# Patient Record
Sex: FEMALE | URBAN - METROPOLITAN AREA
[De-identification: names, ages, dates, MRNs, and addresses within clinical notes are randomized per-mention and may not be internally consistent; named-entity substitution may affect disease eponyms.]

---

## 2018-12-11 ENCOUNTER — VIRTUAL VISIT (OUTPATIENT)
Dept: FAMILY MEDICINE | Facility: OTHER | Age: 43
End: 2018-12-11

## 2018-12-11 NOTE — PROGRESS NOTES
"Date:   Clinician: Juan Jose Mcintosh  Clinician NPI: [id number]  Patient: Ana Solano  Patient : 1975  Patient Address: [facility] [place] [address]  Patient Phone: [phone]  Visit Protocol: Yeast infection  Patient Summary:  Ana is a 43 year old ( : 1975 ) female who initiated a Visit for a presumed vaginal yeast infection. When asked the question \"Please sign me up to receive news, health information and promotions. \", Ana responded \"No\".    Ana began noticing vaginal pruritus 5-10 days ago.   She denies having abdominal pain, vaginal discharge, perivulvar pruritus, perivulvar rash, and open sores. She also denies feeling feverish.   Ana has a history of vaginal yeast infections. She has had two (2) occurrences in the past year and the current symptoms are similar to previous yeast infections. She has not used fluconazole (Diflucan) to treat previous yeast infections.   She has attempted to treat her symptoms with anti-fungal suppositories for yeast infections and has used the anti-fungal medication as directed. Anti-fungal medication used to treat symptoms as reported by the patient (free text): One time and three day treatments   She prefers a fluconazole (Diflucan) Pill.   She denies taking antibiotics in the past 2 weeks. She denies having a sexually transmitted disease.   She denies pregnancy and denies breastfeeding. She does not menstruate.   She does NOT smoke or use smokeless tobacco.    MEDICATIONS: No current medications, ALLERGIES: NKDA  Clinician Response:  Dear Ana,  Based on the information you have provided, you likely have a vaginal yeast infection which is a common infection of the vagina caused by a fungus.  I am prescribing:   Fluconazole (Diflucan) 150 mg oral tablet. Take 1 tablet by mouth in a single dose. There are no refills with this prescription.  While you have yeast infection symptoms, do the following:      Avoid " irritants such as scented bath products, tampons, pads, or vaginal sprays and powders.    Avoid douching.    Wear cotton underwear and if you are comfortable doing so, do not wear underwear to bed.    Avoid hot tubs and whirlpool spas.     Symptoms should improve with 1-2 days of treatment and resolve entirely in 5-7 days. However, there are other types of vaginal infections that have some of the same symptoms as a yeast infection. For this reason, please be seen in person if symptoms have not improved after 3 days or resolved after 10 days.   Diagnosis: Candida vulvovaginitis  Diagnosis ICD: B37.3  Prescription: fluconazole (Diflucan) 150 mg oral tablet 1 tablet, 1 days supply. Take 1 tablet by mouth in a single dose. Refills: 0, Refill as needed: no, Allow substitutions: yes  Pharmacy: [id number] - [phone] [address], [place] [address]